# Patient Record
Sex: FEMALE | Race: OTHER | Employment: UNEMPLOYED | URBAN - METROPOLITAN AREA
[De-identification: names, ages, dates, MRNs, and addresses within clinical notes are randomized per-mention and may not be internally consistent; named-entity substitution may affect disease eponyms.]

---

## 2017-09-11 ENCOUNTER — APPOINTMENT (OUTPATIENT)
Dept: ULTRASOUND IMAGING | Age: 37
End: 2017-09-11
Attending: EMERGENCY MEDICINE
Payer: SUBSIDIZED

## 2017-09-11 ENCOUNTER — HOSPITAL ENCOUNTER (EMERGENCY)
Age: 37
Discharge: HOME | End: 2017-09-11
Attending: EMERGENCY MEDICINE | Admitting: EMERGENCY MEDICINE
Payer: SUBSIDIZED

## 2017-09-11 VITALS
OXYGEN SATURATION: 96 % | RESPIRATION RATE: 16 BRPM | TEMPERATURE: 98.1 F | BODY MASS INDEX: 19.56 KG/M2 | WEIGHT: 113.98 LBS | DIASTOLIC BLOOD PRESSURE: 68 MMHG | SYSTOLIC BLOOD PRESSURE: 116 MMHG | HEART RATE: 89 BPM

## 2017-09-11 DIAGNOSIS — R10.13 ABDOMINAL PAIN, EPIGASTRIC: Primary | ICD-10-CM

## 2017-09-11 LAB
ALBUMIN SERPL-MCNC: 4.1 G/DL (ref 3.5–5)
ALBUMIN/GLOB SERPL: 1 {RATIO} (ref 1.1–2.2)
ALP SERPL-CCNC: 85 U/L (ref 45–117)
ALT SERPL-CCNC: 22 U/L (ref 12–78)
ANION GAP SERPL CALC-SCNC: 9 MMOL/L (ref 5–15)
APPEARANCE UR: CLEAR
AST SERPL-CCNC: 12 U/L (ref 15–37)
BACTERIA URNS QL MICRO: NEGATIVE /HPF
BASOPHILS # BLD: 0 K/UL (ref 0–0.1)
BASOPHILS NFR BLD: 0 % (ref 0–1)
BILIRUB SERPL-MCNC: <0.1 MG/DL (ref 0.2–1)
BILIRUB UR QL: NEGATIVE
BUN SERPL-MCNC: 15 MG/DL (ref 6–20)
BUN/CREAT SERPL: 23 (ref 12–20)
CALCIUM SERPL-MCNC: 9.2 MG/DL (ref 8.5–10.1)
CHLORIDE SERPL-SCNC: 102 MMOL/L (ref 97–108)
CO2 SERPL-SCNC: 28 MMOL/L (ref 21–32)
COLOR UR: NORMAL
CREAT SERPL-MCNC: 0.66 MG/DL (ref 0.55–1.02)
EOSINOPHIL # BLD: 0 K/UL (ref 0–0.4)
EOSINOPHIL NFR BLD: 0 % (ref 0–7)
EPITH CASTS URNS QL MICRO: NORMAL /LPF
ERYTHROCYTE [DISTWIDTH] IN BLOOD BY AUTOMATED COUNT: 12.9 % (ref 11.5–14.5)
GLOBULIN SER CALC-MCNC: 4.1 G/DL (ref 2–4)
GLUCOSE SERPL-MCNC: 97 MG/DL (ref 65–100)
GLUCOSE UR STRIP.AUTO-MCNC: NEGATIVE MG/DL
HCT VFR BLD AUTO: 41 % (ref 35–47)
HGB BLD-MCNC: 13.7 G/DL (ref 11.5–16)
HGB UR QL STRIP: NEGATIVE
HYALINE CASTS URNS QL MICRO: NORMAL /LPF (ref 0–5)
KETONES UR QL STRIP.AUTO: NEGATIVE MG/DL
LEUKOCYTE ESTERASE UR QL STRIP.AUTO: NEGATIVE
LIPASE SERPL-CCNC: 142 U/L (ref 73–393)
LYMPHOCYTES # BLD: 2.8 K/UL (ref 0.8–3.5)
LYMPHOCYTES NFR BLD: 27 % (ref 12–49)
MCH RBC QN AUTO: 30.7 PG (ref 26–34)
MCHC RBC AUTO-ENTMCNC: 33.4 G/DL (ref 30–36.5)
MCV RBC AUTO: 91.9 FL (ref 80–99)
MONOCYTES # BLD: 0.8 K/UL (ref 0–1)
MONOCYTES NFR BLD: 8 % (ref 5–13)
NEUTS SEG # BLD: 6.6 K/UL (ref 1.8–8)
NEUTS SEG NFR BLD: 65 % (ref 32–75)
NITRITE UR QL STRIP.AUTO: NEGATIVE
PH UR STRIP: 6.5 [PH] (ref 5–8)
PLATELET # BLD AUTO: 335 K/UL (ref 150–400)
POTASSIUM SERPL-SCNC: 4.1 MMOL/L (ref 3.5–5.1)
PROT SERPL-MCNC: 8.2 G/DL (ref 6.4–8.2)
PROT UR STRIP-MCNC: NEGATIVE MG/DL
RBC # BLD AUTO: 4.46 M/UL (ref 3.8–5.2)
RBC #/AREA URNS HPF: NORMAL /HPF (ref 0–5)
SODIUM SERPL-SCNC: 139 MMOL/L (ref 136–145)
SP GR UR REFRACTOMETRY: 1.01 (ref 1–1.03)
UA: UC IF INDICATED,UAUC: NORMAL
UROBILINOGEN UR QL STRIP.AUTO: 0.2 EU/DL (ref 0.2–1)
WBC # BLD AUTO: 10.2 K/UL (ref 3.6–11)
WBC URNS QL MICRO: NORMAL /HPF (ref 0–4)

## 2017-09-11 PROCEDURE — 36415 COLL VENOUS BLD VENIPUNCTURE: CPT | Performed by: EMERGENCY MEDICINE

## 2017-09-11 PROCEDURE — 96375 TX/PRO/DX INJ NEW DRUG ADDON: CPT

## 2017-09-11 PROCEDURE — 74011250636 HC RX REV CODE- 250/636: Performed by: EMERGENCY MEDICINE

## 2017-09-11 PROCEDURE — 74011000250 HC RX REV CODE- 250: Performed by: EMERGENCY MEDICINE

## 2017-09-11 PROCEDURE — 81001 URINALYSIS AUTO W/SCOPE: CPT | Performed by: EMERGENCY MEDICINE

## 2017-09-11 PROCEDURE — 99283 EMERGENCY DEPT VISIT LOW MDM: CPT

## 2017-09-11 PROCEDURE — 96374 THER/PROPH/DIAG INJ IV PUSH: CPT

## 2017-09-11 PROCEDURE — 83690 ASSAY OF LIPASE: CPT | Performed by: EMERGENCY MEDICINE

## 2017-09-11 PROCEDURE — 96361 HYDRATE IV INFUSION ADD-ON: CPT

## 2017-09-11 PROCEDURE — 85025 COMPLETE CBC W/AUTO DIFF WBC: CPT | Performed by: EMERGENCY MEDICINE

## 2017-09-11 PROCEDURE — 76705 ECHO EXAM OF ABDOMEN: CPT

## 2017-09-11 PROCEDURE — 80053 COMPREHEN METABOLIC PANEL: CPT | Performed by: EMERGENCY MEDICINE

## 2017-09-11 RX ORDER — FAMOTIDINE 10 MG/ML
20 INJECTION INTRAVENOUS
Status: COMPLETED | OUTPATIENT
Start: 2017-09-11 | End: 2017-09-11

## 2017-09-11 RX ORDER — FAMOTIDINE 20 MG/1
20 TABLET, FILM COATED ORAL 2 TIMES DAILY
Qty: 20 TAB | Refills: 0 | Status: SHIPPED | OUTPATIENT
Start: 2017-09-11 | End: 2017-09-21

## 2017-09-11 RX ORDER — ONDANSETRON 2 MG/ML
4 INJECTION INTRAMUSCULAR; INTRAVENOUS
Status: COMPLETED | OUTPATIENT
Start: 2017-09-11 | End: 2017-09-11

## 2017-09-11 RX ORDER — ONDANSETRON 8 MG/1
8 TABLET, ORALLY DISINTEGRATING ORAL
Qty: 12 TAB | Refills: 0 | OUTPATIENT
Start: 2017-09-11 | End: 2020-07-21

## 2017-09-11 RX ADMIN — FAMOTIDINE 20 MG: 10 INJECTION, SOLUTION INTRAVENOUS at 20:22

## 2017-09-11 RX ADMIN — SODIUM CHLORIDE 1000 ML: 900 INJECTION, SOLUTION INTRAVENOUS at 20:19

## 2017-09-11 RX ADMIN — ONDANSETRON 4 MG: 2 INJECTION INTRAMUSCULAR; INTRAVENOUS at 20:20

## 2017-09-11 NOTE — ED TRIAGE NOTES
Patient arrives to ED for complaints of abdominal pain and bloating for about 1 week. Denies diarrhea but nausea presents when eating.

## 2017-09-12 NOTE — DISCHARGE INSTRUCTIONS
Indigestión (dispepsia o acidez estomacal): Instrucciones de cuidado - [ Indigestion (Dyspepsia or Heartburn): Care Instructions ]  Instrucciones de cuidado  A veces puede ser difícil determinar la causa de la indigestión (dispepsia o acidez estomacal). En [employer], los casos de [employer] con abotagamiento, ardor, eructos y náuseas son leves y desaparecen después de varias horas. El tratamiento en el hogar y los medicamentos de venta leilani a menudo pueden [employer] síntomas. Darvin si usted lui algún medicamento para aliviar la indigestión sin hacer cambios en fierro Jarek Yudelka y estilo de alex, es muy probable que vuelvan lisa síntomas repetidamente. Si tiene indigestión con frecuencia, esto podría indicar un problema de ashlie más grave. Asegúrese de hacer un seguimiento con fierro médico, el cual podría necesitar pruebas para asegurarse de cuál es la causa de fierro indigestión. La atención de seguimiento es yosi parte clave de fierro tratamiento y seguridad. Asegúrese de hacer y acudir a todas las citas, y llame a fierro médico si está teniendo problemas. También es yosi buena idea saber los resultados de lisa exámenes y mantener yosi lista de los medicamentos que lui. ¿Cómo puede cuidarse en el [facility]? · Fierro médico podría recomendarle medicamentos de [employer]. Para la indigestión leve u ocasional podrían ayudarle los antiácidos stacia Tums, Gaviscon, Mylanta o Maalox. Tenga cuidado cuando tome medicamentos antiácidos de [employer]. Muchos de estos medicamentos contienen aspirina. Stephany la etiqueta para asegurarse de no estar tomando más de la dosis recomendada. Demasiada aspirina puede ser perjudicial.  · Fierro médico también podría recomendar reductores de ácido de venta leilani, stacia Pepcid AC, Tagamet HB, Zantac 75 o Prilosec. Stephany y siga todas las instrucciones de la [place]. [place] estos medicamentos con frecuencia, hable con fierro médico.  · Cambie lisa hábitos alimentarios.   ¨ Es mejor comer varias comidas pequeñas en lugar de dos o tony comidas abundantes. ¨ Después de comer, espere de 2 a 3 horas antes de recostarse. ¨ El chocolate, la menta y el alcohol pueden empeorar la GERD. ¨ En [employer], los [employer], los alimentos que tienen mucho ácido (stacia los tomates y las naranjas) y el café pueden empeorar los síntomas de la GERD. Si lisa síntomas empeoran después de comer un determinado alimento, se recomienda que deje de comer dre alimento para reshma si lisa síntomas mejoran. · No fume ni masque tabaco. Fumar puede agravar la GERD. Si necesita ayuda para dejar de usar tabaco, hable con fierro médico sobre programas y medicamentos para dejar de usarlo. Estos pueden aumentar lisa probabilidades de dejar el hábito para siempre. · Si tiene síntomas de GERD isiah la noche, eleve la cabecera de fierro cama entre 6 y 6 pulgadas (entre 15 y 20 cm) colocando bloques debajo del chapo de la cama o yosi cuña de espuma debajo de la cabecera del colchón. (Usar almohadas adicionales no funciona). · No use ropa que le ajuste mucho la parte media del cuerpo. · Baje de peso, si necesita hacerlo. Perder solo de 5 a 10 libras (2.3 a 4.5 kg) puede ayudarle. · No tome medicamentos antiinflamatorios stacia aspirina, ibuprofeno (Advil, Motrin) o naproxeno (Aleve). Estos medicamentos pueden irritarle el estómago. Si necesita un analgésico (medicamento para el dolor), pruebe con acetaminofén (Tylenol), que no causa malestar estomacal.  ¿Cuándo debe pedir ayuda? Llame al 911 en cualquier momento que considere que necesita atención de [place]. Por ejemplo, llame si:  · Se desmayó (perdió el conocimiento). · Vomita art o algo parecido a granos de café molido. · Las heces son de color rojizo o muy sanguinolentas (con art). · Siente dolor o presión en el pecho. Estos síntomas podrían estar acompañados de:  ¨ Sudoración. ¨ Falta de aire. ¨ Náuseas o vómito.   ¨ Dolor que se extiende del pecho al josé, juliano Espinoza, o [place] o [place] hombros o brazos. ¨ Sensación de [employer] o aturdimiento. ¨ Pulso rápido o irregular. Después de llamar al 911, mastique 1 aspirina para adultos. Espere yosi ambulancia. No trate de conducir usted mismo un automóvil. Llame a fierro médico ahora mismo o busque atención médica inmediata si:  · Tiene dolor abdominal intenso. · Amarilys heces son negruzcas y parecidas al alquitrán o tienen rastros de [address] · Tiene dificultades para tragar. · Pierde peso y no sabe por qué. Preste especial atención a los cambios en fierro ashlie y asegúrese de comunicarse con fierro médico si:  · No mejora stacia se esperaba. ¿Dónde puede encontrar más información en inglés? Chelsie Srivastava a [email or web address]-cathryn.info/. Tammy Chan H026 en la búsqueda para aprender más acerca de \"Indigestión (dispepsia o acidez estomacal): Instrucciones de cuidado - [ Indigestion (Dyspepsia or Heartburn): Care Instructions ]. \"  Revisado: 16 noviembre, 2016  Versión del contenido: 11.3  © 9684-5634 Healthwise, Incorporated. Las instrucciones de cuidado fueron adaptadas bajo licencia por [employer] (which disclaims liability or warranty for this information). Si usted tiene [place] afección médica o sobre estas instrucciones, siempre pregunte a fierro profesional de ashlie. Healthwise, Incorporated niega toda garantía o responsabilidad por fierro uso de esta información.

## 2017-09-12 NOTE — ED PROVIDER NOTES
HPI Comments: 42 y.o. female with past medical history significant for depression who presents to the ED with chief complaint of abdominal pain. Pt reports epigastric and RUQ abdominal pain onset about a week ago that is exacerbated by eating. Pt states her abdominal pain is accompanied by nausea. Pt states she has taken Pepto Bismol with some relief. Pt denies hx of abdominal surgeries. Pt denies fever, chills, vomiting, urinary sx, or diarrhea. There are no other acute medical complaints voiced at this time. Social Hx: Denies EtOH use. PCP: Tre Weir MD    Note written by Shilo Fitzgerald, as dictated by Love Chowdary. Chavo Monroy MD 8:00 PM     The history is provided by the patient and a relative. The history is limited by a language barrier ([place]). No  was used. Past Medical History:   Diagnosis Date    Psychiatric disorder     depression       History reviewed. No pertinent surgical history. History reviewed. No pertinent family history. Social History     Social History    Marital status: SINGLE     Spouse name: N/A    Number of children: N/A    Years of education: N/A     Occupational History    Not on file. Social History Main Topics    Smoking status: Never Smoker    Smokeless tobacco: Never Used    Alcohol use No    Drug use: No    Sexual activity: Not on file     Other Topics Concern    Not on file     Social History Narrative         ALLERGIES: Review of patient's allergies indicates no known allergies. Review of Systems   Constitutional: Negative for chills and fever. HENT: Negative for ear pain and sore throat. Eyes: Negative for pain. Respiratory: Negative for chest tightness and shortness of breath. Cardiovascular: Negative for chest pain and leg swelling. Gastrointestinal: Positive for abdominal pain (epigastric and RUQ) and nausea. Negative for diarrhea and vomiting.    Genitourinary: Negative for difficulty urinating, dysuria, flank pain, frequency and hematuria. Musculoskeletal: Negative for back pain. Skin: Negative for rash. Neurological: Negative for headaches. All other systems reviewed and are negative. Vitals:    09/11/17 1926   BP: 118/59   Pulse: 90   Resp: 18   Temp: 98.1 °F (36.7 °C)   SpO2: 95%   Weight: 51.7 kg (113 lb 15.7 oz)            Physical Exam   Constitutional: She appears well-developed and well-nourished. HENT:   Head: Normocephalic and atraumatic. Mouth/Throat: Oropharynx is clear and moist.   Eyes: Conjunctivae and EOM are normal. Pupils are equal, round, and reactive to light. No scleral icterus. Neck: Neck supple. No tracheal deviation present. Cardiovascular: Normal rate, regular rhythm, normal heart sounds and intact distal pulses. Pulmonary/Chest: Effort normal and breath sounds normal. No respiratory distress. Abdominal: Soft. She exhibits no distension. There is tenderness (epigastric region and RUQ). There is no rebound and no guarding. Genitourinary:   Genitourinary Comments: deferred   Musculoskeletal: She exhibits no edema. Neurological: She is alert. Skin: Skin is warm and dry. Psychiatric: She has a normal mood and affect. Nursing note and vitals reviewed. Note written by Shilo Love, as dictated by Clayton Jackson. Honey Dash MD 8:01 PM    MDM  Number of Diagnoses or Management Options  Abdominal pain, epigastric:   Diagnosis management comments: The patient presents with abdominal pain with a differential diagnosis of biliary colic, cholecystitis, gastritis and pancreatitis      ED Course       Procedures    10:35 PM  The patient has been reevaluated. The patient is ready for discharge. The patient's signs, symptoms, diagnosis, and discharge instructions have been discussed and the patient/ family has conveyed their understanding. The patient is to follow up as recommended or return to the ED should their symptoms worsen.  Plan has been discussed and the patient is in agreement. LABORATORY TESTS:  Recent Results (from the past 12 hour(s))   CBC WITH AUTOMATED DIFF    Collection Time: 09/11/17  7:52 PM   Result Value Ref Range    WBC 10.2 3.6 - 11.0 K/uL    RBC 4.46 3.80 - 5.20 M/uL    HGB 13.7 11.5 - 16.0 g/dL    HCT 41.0 35.0 - 47.0 %    MCV 91.9 80.0 - 99.0 FL    MCH 30.7 26.0 - 34.0 PG    MCHC 33.4 30.0 - 36.5 g/dL    RDW 12.9 11.5 - 14.5 %    PLATELET 969 234 - 137 K/uL    NEUTROPHILS 65 32 - 75 %    LYMPHOCYTES 27 12 - 49 %    MONOCYTES 8 5 - 13 %    EOSINOPHILS 0 0 - 7 %    BASOPHILS 0 0 - 1 %    ABS. NEUTROPHILS 6.6 1.8 - 8.0 K/UL    ABS. LYMPHOCYTES 2.8 0.8 - 3.5 K/UL    ABS. MONOCYTES 0.8 0.0 - 1.0 K/UL    ABS. EOSINOPHILS 0.0 0.0 - 0.4 K/UL    ABS. BASOPHILS 0.0 0.0 - 0.1 K/UL   LIPASE    Collection Time: 09/11/17  7:52 PM   Result Value Ref Range    Lipase 142 73 - 509 U/L   METABOLIC PANEL, COMPREHENSIVE    Collection Time: 09/11/17  7:52 PM   Result Value Ref Range    Sodium 139 136 - 145 mmol/L    Potassium 4.1 3.5 - 5.1 mmol/L    Chloride 102 97 - 108 mmol/L    CO2 28 21 - 32 mmol/L    Anion gap 9 5 - 15 mmol/L    Glucose 97 65 - 100 mg/dL    BUN 15 6 - 20 MG/DL    Creatinine 0.66 0.55 - 1.02 MG/DL    BUN/Creatinine ratio 23 (H) 12 - 20      GFR est AA >60 >60 ml/min/1.73m2    GFR est non-AA >60 >60 ml/min/1.73m2    Calcium 9.2 8.5 - 10.1 MG/DL    Bilirubin, total <0.1 (L) 0.2 - 1.0 MG/DL    ALT (SGPT) 22 12 - 78 U/L    AST (SGOT) 12 (L) 15 - 37 U/L    Alk.  phosphatase 85 45 - 117 U/L    Protein, total 8.2 6.4 - 8.2 g/dL    Albumin 4.1 3.5 - 5.0 g/dL    Globulin 4.1 (H) 2.0 - 4.0 g/dL    A-G Ratio 1.0 (L) 1.1 - 2.2     URINALYSIS W/ REFLEX CULTURE    Collection Time: 09/11/17  8:00 PM   Result Value Ref Range    Color YELLOW/STRAW      Appearance CLEAR CLEAR      Specific gravity 1.007 1.003 - 1.030      pH (UA) 6.5 5.0 - 8.0      Protein NEGATIVE  NEG mg/dL    Glucose NEGATIVE  NEG mg/dL    Ketone NEGATIVE  NEG mg/dL    Bilirubin NEGATIVE  NEG      Blood NEGATIVE  NEG      Urobilinogen 0.2 0.2 - 1.0 EU/dL    Nitrites NEGATIVE  NEG      Leukocyte Esterase NEGATIVE  NEG      WBC 0-4 0 - 4 /hpf    RBC 0-5 0 - 5 /hpf    Epithelial cells FEW FEW /lpf    Bacteria NEGATIVE  NEG /hpf    UA:UC IF INDICATED CULTURE NOT INDICATED BY UA RESULT CNI      Hyaline cast 0-2 0 - 5 /lpf       IMAGING RESULTS:  US ABD LTD   Final Result        [facility]    Result Date: 9/11/2017  INDICATION:  Right upper quadrant pain COMPARISON:  None FINDINGS: Limited right upper quadrant ultrasound was performed. The liver is normal. The common bile duct is normal measuring 4 mm in diameter. The gallbladder is normal. The sonographic Liset Lovings sign is absent. The right kidney measures 11.1 cm in length. There is no hydronephrosis or visible ascites. IMPRESSION: Normal right upper quadrant ultrasound. MEDICATIONS GIVEN:  Medications   sodium chloride 0.9 % bolus infusion 1,000 mL (0 mL IntraVENous IV Completed 9/11/17 2149)   ondansetron (ZOFRAN) injection 4 mg (4 mg IntraVENous Given 9/11/17 2020)   famotidine (PF) (PEPCID) injection 20 mg (20 mg IntraVENous Given 9/11/17 2022)       IMPRESSION:  1. Abdominal pain, epigastric        PLAN:  1. Discharge Medication List as of 9/11/2017  9:37 PM      START taking these medications    Details   famotidine (PEPCID) 20 mg tablet Take 1 Tab by mouth two (2) times a day for 10 days. , Print, Disp-20 Tab, R-0      ondansetron (ZOFRAN ODT) 8 mg disintegrating tablet Take 1 Tab by mouth every eight (8) hours as needed for Nausea. , Print, Disp-12 Tab, R-0         CONTINUE these medications which have NOT CHANGED    Details   FLUoxetine (PROZAC) 20 mg capsule Take 20 mg by mouth daily. , Historical Med           2.    Follow-up Information     Follow up With Details Comments [address] MD Call in 1 day  [address]  [phone]      Your PCP Call in 1 day      [facility] EMERGENCY DEPT  If symptoms worsen [address] [facility]  [phone]            Return to ED for new or worsening symptoms       Azeb Herring MD

## 2020-07-19 ENCOUNTER — HOSPITAL ENCOUNTER (EMERGENCY)
Age: 40
Discharge: HOME | End: 2020-07-19
Attending: EMERGENCY MEDICINE | Admitting: EMERGENCY MEDICINE
Payer: SUBSIDIZED

## 2020-07-19 VITALS
RESPIRATION RATE: 18 BRPM | DIASTOLIC BLOOD PRESSURE: 71 MMHG | HEIGHT: 64 IN | SYSTOLIC BLOOD PRESSURE: 103 MMHG | WEIGHT: 120 LBS | BODY MASS INDEX: 20.49 KG/M2 | OXYGEN SATURATION: 99 % | HEART RATE: 68 BPM | TEMPERATURE: 97.9 F

## 2020-07-19 DIAGNOSIS — H66.90 ACUTE OTITIS MEDIA, UNSPECIFIED OTITIS MEDIA TYPE: Primary | ICD-10-CM

## 2020-07-19 DIAGNOSIS — R42 DIZZY SPELLS: ICD-10-CM

## 2020-07-19 PROCEDURE — 99284 EMERGENCY DEPT VISIT MOD MDM: CPT

## 2020-07-19 PROCEDURE — 74011250636 HC RX REV CODE- 250/636: Performed by: EMERGENCY MEDICINE

## 2020-07-19 RX ORDER — MECLIZINE HYDROCHLORIDE 25 MG/1
50 TABLET ORAL
Status: COMPLETED | OUTPATIENT
Start: 2020-07-19 | End: 2020-07-19

## 2020-07-19 RX ORDER — AMOXICILLIN AND CLAVULANATE POTASSIUM 875; 125 MG/1; MG/1
1 TABLET, FILM COATED ORAL 2 TIMES DAILY
Qty: 20 TAB | Refills: 0 | Status: SHIPPED | OUTPATIENT
Start: 2020-07-19 | End: 2020-07-29

## 2020-07-19 RX ORDER — MECLIZINE HYDROCHLORIDE 25 MG/1
25 TABLET ORAL
Qty: 30 TAB | Refills: 0 | Status: SHIPPED | OUTPATIENT
Start: 2020-07-19 | End: 2020-07-29

## 2020-07-19 RX ADMIN — MECLIZINE HYDROCHLORIDE 50 MG: 25 TABLET ORAL at 03:46

## 2020-07-19 NOTE — ED PROVIDER NOTES
Date of Service:  7/19/2020    Patient:  Edgardo Miranda    Chief Complaint:  Dizziness       HPI:  Edgardo Miranda is a 41 y.o.  female who presents for evaluation of dizziness and ringing in her ears. Patient states that for the last 2 to 3 days she has been having intermittent episodes of dizziness. She states that they are getting progressively worse. They seem to be related to position. She does not happen all the time. She also complains of some ringing in her ear right greater than the left. Otherwise patient denies any type of fevers chills chest pain shortness of breath abdominal pain nausea vomiting diarrhea headaches or other acute complaints. No head trauma. Past Medical History:   Diagnosis Date    Psychiatric disorder     depression       No past surgical history on file. No family history on file.     Social History     Socioeconomic History    Marital status: SINGLE     Spouse name: Not on file    Number of children: Not on file    Years of education: Not on file    Highest education level: Not on file   Occupational History    Not on file   Social Needs    Financial resource strain: Not on file    Food insecurity     Worry: Not on file     Inability: Not on file    Transportation needs     Medical: Not on file     Non-medical: Not on file   Tobacco Use    Smoking status: Never Smoker    Smokeless tobacco: Never Used   Substance and Sexual Activity    Alcohol use: No    Drug use: No    Sexual activity: Not on file   Lifestyle    Physical activity     Days per week: Not on file     Minutes per session: Not on file    Stress: Not on file   Relationships    Social connections     Talks on phone: Not on file     Gets together: Not on file     Attends Denominational service: Not on file     Active member of club or organization: Not on file     Attends meetings of clubs or organizations: Not on file     Relationship status: Not on file    Intimate partner violence Fear of current or ex partner: Not on file     Emotionally abused: Not on file     Physically abused: Not on file     Forced sexual activity: Not on file   Other Topics Concern    Not on file   Social History Narrative    Not on file         ALLERGIES: Patient has no known allergies. Review of Systems   HENT: Positive for tinnitus. Negative for ear pain, hearing loss and sore throat. Respiratory: Negative for shortness of breath. Cardiovascular: Negative for chest pain. Gastrointestinal: Positive for nausea. Negative for abdominal pain. Neurological: Positive for dizziness. Negative for weakness, light-headedness and headaches. Psychiatric/Behavioral: Negative for confusion. Vitals:    07/19/20 0339   BP: 141/77   Pulse: 70   Resp: 18   Temp: 97.9 °F (36.6 °C)   SpO2: 99%   Weight: 54.4 kg (120 lb)   Height: 5' 4\" (1.626 m)            Physical Exam  Vitals signs and nursing note reviewed. Constitutional:       General: She is not in acute distress. Appearance: She is well-developed. She is not ill-appearing, toxic-appearing or diaphoretic. HENT:      Head: Normocephalic and atraumatic. Right Ear: Ear canal and external ear normal. There is no impacted cerumen. No mastoid tenderness. Tympanic membrane is injected and erythematous. Left Ear: Tympanic membrane, ear canal and external ear normal. There is no impacted cerumen. No mastoid tenderness. Nose: Nose normal. No congestion. Mouth/Throat:      Mouth: Mucous membranes are moist.      Pharynx: Oropharynx is clear. No oropharyngeal exudate or posterior oropharyngeal erythema. Eyes:      General: No scleral icterus. Right eye: No discharge. Left eye: No discharge. Extraocular Movements: Extraocular movements intact. Conjunctiva/sclera: Conjunctivae normal.      Pupils: Pupils are equal, round, and reactive to light. Neck:      Musculoskeletal: Neck supple. Vascular: No JVD. Trachea: No tracheal deviation. Cardiovascular:      Rate and Rhythm: Normal rate. Pulses: Normal pulses. Heart sounds: No murmur. Pulmonary:      Effort: Pulmonary effort is normal. No respiratory distress. Breath sounds: Normal breath sounds. Abdominal:      General: Abdomen is flat. Bowel sounds are normal.   Musculoskeletal: Normal range of motion. Right lower leg: No edema. Left lower leg: No edema. Skin:     General: Skin is warm and dry. Capillary Refill: Capillary refill takes less than 2 seconds. Findings: No rash. Neurological:      Mental Status: She is alert and oriented to person, place, and time. Mental status is at baseline. Cranial Nerves: No cranial nerve deficit. Sensory: No sensory deficit. Motor: No weakness. Coordination: Coordination normal.      Gait: Gait normal.   Psychiatric:         Mood and Affect: Mood normal.         Behavior: Behavior normal.         Thought Content: Thought content normal.         Judgment: Judgment normal.          MDM  Number of Diagnoses or Management Options  Acute otitis media, unspecified otitis media type:   Dizzy spells:        VITAL SIGNS:  Patient Vitals for the past 4 hrs:   Temp Pulse Resp BP SpO2   07/19/20 0500  68 18 103/71 99 %   07/19/20 0339 97.9 °F (36.6 °C) 70 18 141/77 99 %         LABS:  No results found for this or any previous visit (from the past 6 hour(s)). IMAGING:  No orders to display         Medications During Visit:  Medications   meclizine (ANTIVERT) tablet 50 mg (50 mg Oral Given 7/19/20 0346)         DECISION MAKING:  Kathleen Velasquez is a 41 y.o. female who comes in as above. Here patient appears well. No  needed for encounter. Patient has had minimal resolution of dizziness but has a normal neuro exam and ambulates well. Patient has otitis media which could be contributing to his symptoms.  Will treat with medications as below and have her follow up with PCP Monday. Patient agreeable to the plan. All questions answered. IMPRESSION:  1. Acute otitis media, unspecified otitis media type    2. Dizzy spells        DISPOSITION:  Discharged      Discharge Medication List as of 7/19/2020  4:49 AM      START taking these medications    Details   amoxicillin-clavulanate (Augmentin) 875-125 mg per tablet Take 1 Tab by mouth two (2) times a day for 10 days. , Print, Disp-20 Tab,R-0      meclizine (ANTIVERT) 25 mg tablet Take 1 Tab by mouth three (3) times daily as needed for Dizziness for up to 10 days. , Print, Disp-30 Tab,R-0         CONTINUE these medications which have NOT CHANGED    Details   ondansetron (ZOFRAN ODT) 8 mg disintegrating tablet Take 1 Tab by mouth every eight (8) hours as needed for Nausea. , Print, Disp-12 Tab, R-0      FLUoxetine (PROZAC) 20 mg capsule Take 20 mg by mouth daily. , Historical Med              Follow-up Information     Follow up With Specialties Details Why Contact Info    Your PCP  Schedule an appointment as soon as possible for a visit               The patient is asked to follow-up with their primary care provider in the next several days. They are to call tomorrow for an appointment. The patient is asked to return promptly for any increased concerns or worsening of symptoms. They can return to this emergency department or any other emergency department.     Procedures

## 2020-07-19 NOTE — ED TRIAGE NOTES
Pt complaining of worsening dizziness over the last 3 days. Pt states that the room is spinning.  Denies N/V.

## 2020-07-21 ENCOUNTER — HOSPITAL ENCOUNTER (EMERGENCY)
Age: 40
Discharge: HOME | End: 2020-07-21
Attending: EMERGENCY MEDICINE
Payer: SUBSIDIZED

## 2020-07-21 VITALS
BODY MASS INDEX: 25.75 KG/M2 | RESPIRATION RATE: 16 BRPM | OXYGEN SATURATION: 98 % | DIASTOLIC BLOOD PRESSURE: 64 MMHG | HEART RATE: 86 BPM | SYSTOLIC BLOOD PRESSURE: 105 MMHG | TEMPERATURE: 99.1 F | WEIGHT: 150 LBS

## 2020-07-21 DIAGNOSIS — J30.2 SEASONAL ALLERGIC RHINITIS, UNSPECIFIED TRIGGER: ICD-10-CM

## 2020-07-21 DIAGNOSIS — R42 VERTIGO: Primary | ICD-10-CM

## 2020-07-21 PROCEDURE — 74011636637 HC RX REV CODE- 636/637: Performed by: EMERGENCY MEDICINE

## 2020-07-21 PROCEDURE — 74011250637 HC RX REV CODE- 250/637: Performed by: EMERGENCY MEDICINE

## 2020-07-21 PROCEDURE — 99283 EMERGENCY DEPT VISIT LOW MDM: CPT

## 2020-07-21 RX ORDER — PREDNISONE 20 MG/1
40 TABLET ORAL
Status: COMPLETED | OUTPATIENT
Start: 2020-07-21 | End: 2020-07-21

## 2020-07-21 RX ORDER — ONDANSETRON 4 MG/1
4 TABLET, ORALLY DISINTEGRATING ORAL
Qty: 4 TAB | Refills: 0 | Status: SHIPPED | OUTPATIENT
Start: 2020-07-21

## 2020-07-21 RX ORDER — PREDNISONE 20 MG/1
20 TABLET ORAL DAILY
Qty: 4 TAB | Refills: 0 | Status: SHIPPED | OUTPATIENT
Start: 2020-07-21 | End: 2020-07-25

## 2020-07-21 RX ORDER — ONDANSETRON 4 MG/1
4 TABLET, ORALLY DISINTEGRATING ORAL
Status: COMPLETED | OUTPATIENT
Start: 2020-07-21 | End: 2020-07-21

## 2020-07-21 RX ADMIN — PREDNISONE 40 MG: 20 TABLET ORAL at 15:24

## 2020-07-21 RX ADMIN — ONDANSETRON 4 MG: 4 TABLET, ORALLY DISINTEGRATING ORAL at 15:24

## 2020-07-21 NOTE — DISCHARGE INSTRUCTIONS
Patient Education   Please continue with your previously prescribed medications until complete. Alergias estacionales: Instrucciones de cuidado  Seasonal Allergies: Care Instructions  Instrucciones de cuidado  Las alergias ocurren cuando el sistema de defensa del organismo (sistema inmunitario) reacciona de manera excesiva ante ciertas sustancias. El sistema inmunitario trata yosi sustancia inofensiva stacia si fuera un microbio perjudicial o un virus. Muchas cosas pueden provocar esta reacción excesiva. Los ejemplos [employer], los medicamentos, los alimentos, el polvo, la caspa de los animales y el moho. Lisa alergias son estacionales si usted presenta síntomas solo en ciertas épocas del [place]. En dre pebbles, probablemente sea alérgico al polen de ciertos árboles, hierbas o [place]. Las [employer] pueden ser leves o graves. Los medicamentos de [address] para las alergias pueden aliviar algunos síntomas. Stephany y siga todas las indicaciones en la etiqueta. Manejar las alergias es yosi parte importante del estar saludable. Fierro médico puede sugerirle que se someta a pruebas para tratar de encontrar la causa de lisa alergias. Yosi vez que sepa qué cosas provocan los síntomas, puede evitarlas. [place] puede prevenir los síntomas de [place] y [de-identified] de [facility]. En algunos casos, la inmunoterapia podría ayudar. Para alea tratamiento, usted se aplica inyecciones o [place] pastillas que contienen yosi pequeña cantidad de ciertos alérgenos. Fierro cuerpo \"se acostumbra\" al alérgeno, de modo que usted reacciona menos a alea con el Dor[place]. Alea tipo de tratamiento puede ayudarle a prevenir o reducir algunos síntomas de las alergias. La atención de seguimiento es yosi parte clave de fierro tratamiento y seguridad. Asegúrese de hacer y acudir a todas las citas, y llame a fierro médico si está teniendo problemas. También es yosi buena idea saber los resultados de lisa exámenes y mantener yosi lista de los medicamentos que lui.   ¿Cómo puede cuidarse en el hogar? · Sea vargas con los medicamentos. [place] lisa medicamentos exactamente stacia le fueron recetados. Llame a fierro médico si irma estar teniendo un problema con los medicamentos. · Sukhdev la temporada de [place], [place] las ventanas cerradas. Si necesita utilizar aire acondicionado, Veronica o limpie todos los filtros cada mes. Dúchese y [employer] ropa después de bobby estado afuera. · Permanezca en interiores cuando haya mucho polen. Pase la aspiradora yosi o dos veces a la semana. Use oysi aspiradora con filtro HEPA o filtro de doble espesor. ¿Cuándo debe pedir ayuda? Aplíquese yosi inyección de epinefrina si:  · Irma que está teniendo yosi reacción alérgica grave. Después de aplicarse yosi inyección de epinefrina, llame al 911, incluso si se siente mejor. Llame  si:  · Tiene síntomas de yosi reacción alérgica grave. Estos pueden incluir:  ? Zonas elevadas y enrojecidas (ronchas) que aparecen repentinamente por todo el cuerpo. ? Hinchazón de la garganta, la boca, los labios o la [place]. ? Dificultades para respirar. ? Pérdida del conocimiento [facility]). O puede sentirse muy mareado o de repente sentirse débil, confuso o inquieto. · Le galarza aplicado yosi inyección de epinefrina, incluso si se siente mejor. Llame a fierro médico ahora mismo o busque atención médica inmediata si:  · Tiene síntomas de yosi reacción alérgica, stacia:  ? Salpullido o ronchas (zonas elevadas y enrojecidas en la piel). ? Comezón. ? Dorathy Fried. ? Dolor abdominal, náuseas o vómito. Preste especial atención a los cambios en fierro ashlie y asegúrese de comunicarse con fierro médico si:  · No mejora stacia se esperaba. ¿Dónde puede encontrar más información en inglés? Vaya a [email or web address]  Ramiro Panda N[address] en la búsqueda para aprender más acerca de \"Alergias estacionales: Instrucciones de cuidado. \"  Revisado: 7 octubre, 8321               [facility] del contenido: 12.5  © 9239-2591 Healthwise, Incorporated. Las instrucciones de cuidado fueron adaptadas bajo licencia por [employer] (which disclaims liability or warranty for this information). Si usted tiene [place] afección médica o sobre estas instrucciones, siempre pregunte a fierro profesional de ashlie. [facility] niega toda garantía o responsabilidad por fierro uso de esta información. Patient Education        Vértigo: Instrucciones de cuidado  Vertigo: Care Instructions  Instrucciones de cuidado    El vértigo es yosi sensación de que usted o el ambiente que le rodea se mueve cuando no es así. Con frecuencia se describe stacia yosi sensación de girar, christina vueltas, caer o inclinarse. El vértigo podría hacer que vomite o sienta náuseas. Podría tener dificultades para caminar o estar de pie y podría perder el equilibrio. El vértigo es a [employer] relacionado con un problema del oído interno, cedrick puede tener otras causas más serias. Si el vértigo continúa, usted podría necesitar más pruebas para hallar fierro causa. La atención de seguimiento es yosi parte clave de fierro tratamiento y seguridad. Asegúrese de hacer y acudir a todas las citas, y llame a fierro médico si está teniendo problemas. También es yosi buena idea saber los resultados de lisa exámenes y mantener yosi lista de los medicamentos que lui. ¿Cómo puede cuidarse en el hogar? · No se acueste boca arriba. Elévese un poco. [place] podría reducir la sensación de girar. Mantenga los ojos abiertos. · Muévase despacio para no caer. · Si fierro médico le recomienda algún medicamento, tómelo exactamente según las indicaciones. · No conduzca cuando tenga vértigo. Ciertos ejercicios, conocidos stacia ejercicios de Pop, pueden ayudar a disminuir el vértigo. Para hacer los ejercicios de Pop:  · Siéntese al borde de yosi cama o un sofá y acuéstese rápido del lado que le causa el peor vértigo. Acuéstese de lado, sobre el oído [place].   · Quédese en dari posición isiah por lo menos 30 segundos o hasta que el vértigo pase. · Siéntese. Si esto le causa vértigo, espere a que pase. · Repita alea procedimiento del otro lado. · Repita esto 10 veces. Daniel estos ejercicios 2 veces al día hasta que la sensación de vértigo desaparezca. ¿Cuándo debe pedir ayuda? Llame [id number] en cualquier momento que considere que necesita atención de emergencia. Por ejemplo, llame si:  · Se desmayó (perdió el conocimiento). · Tiene síntomas de un ataque cerebral. Estos podrían incluir:  ? Entumecimiento, hormigueo, debilitamiento o pérdida de movimiento repentinos en la kevyn, el brazo o la pierna, sobre todo si ocurre en un solo lado del cuerpo. ? Cambios repentinos en la visión. ? Dificultades repentinas para hablar. ? Confusión repentina o dificultad para comprender frases sencillas. ? Problemas repentinos para caminar o mantener el equilibrio. ? Dolor de [place] intenso y repentino, distinto de los miguel de [place]. Llame a fierro médico ahora mismo o busque atención médica inmediata si:  · Tiene vértigo junto con fiebre, dolor de mag o zumbido en los oídos. · Tiene náuseas o vómito nuevos o éstos aumentan. Preste especial atención a los cambios en fierro ashlie y asegúrese de comunicarse con fierro médico si:  · El vértigo empeora u ocurre con mayor frecuencia. · El vértigo no mejora después de 2 semanas. ¿Dónde puede encontrar más información en inglés? Valentino Fraise a [email or web address]-[email or web address].[email or web address]  [place]osi [address]708 en la búsqueda para aprender más acerca de \"Vértigo: Instrucciones de cuidado. \"  Revisado: 29 julio, 0980               GKTJVQE del contenido: 12.5  © 5120-7259 Healthwise, Incorporated. Las instrucciones de cuidado fueron adaptadas bajo licencia por [employer] (which disclaims liability or warranty for this information). Si usted tiene [place] afección médica o sobre estas instrucciones, siempre pregunte a fierro profesional de ashlie.  [facility] niega toda garantía o responsabilidad por fierro uso de esta información.

## 2020-07-21 NOTE — ED PROVIDER NOTES
HPI patient is a 49-year-old  female who presents to the ED with reports of continued intermittent dizzy spells since Thursday evening. She was evaluated here on July 19 and prescribed Augmentin and Antivert which she has been taking with slight improvement. The dizziness increases with head movement. It does not interfere with her sleep at night. Denies fever,cough, cold symptoms,headache,  neck pain, visual changes, focal weakness or rash. Denies any  difficulty breathing, difficulty swallowing, SOB or chest pain. Denies any nausea, vomiting or diarrhea. Pt. Reports that she drove herself here. Hand eye coordination are intact; normal reflexes; normal gait. Language line employed; old chart reviewed. Past Medical History:   Diagnosis Date    Psychiatric disorder     depression       No past surgical history on file. No family history on file.     Social History     Socioeconomic History    Marital status: SINGLE     Spouse name: Not on file    Number of children: Not on file    Years of education: Not on file    Highest education level: Not on file   Occupational History    Not on file   Social Needs    Financial resource strain: Not on file    Food insecurity     Worry: Not on file     Inability: Not on file    Transportation needs     Medical: Not on file     Non-medical: Not on file   Tobacco Use    Smoking status: Never Smoker    Smokeless tobacco: Never Used   Substance and Sexual Activity    Alcohol use: No    Drug use: No    Sexual activity: Not on file   Lifestyle    Physical activity     Days per week: Not on file     Minutes per session: Not on file    Stress: Not on file   Relationships    Social connections     Talks on phone: Not on file     Gets together: Not on file     Attends Denominational service: Not on file     Active member of club or organization: Not on file     Attends meetings of clubs or organizations: Not on file     Relationship status: Not on file   [address] [place] [address] Intimate partner violence     Fear of current or ex partner: Not on file     Emotionally abused: Not on file     Physically abused: Not on file     Forced sexual activity: Not on file   Other Topics Concern    Not on file   Social History Narrative    Not on file         ALLERGIES: Patient has no known allergies. Review of Systems   Constitutional: Negative for activity change, appetite change, fever and unexpected weight change. HENT: Negative for congestion and trouble swallowing. Gastrointestinal: Negative for abdominal pain. Genitourinary: Negative for dysuria. Neurological: Positive for dizziness. Negative for weakness, light-headedness and headaches. All other systems reviewed and are negative. Vitals:    20 1454 20 1517   BP: 105/64    Pulse: 86    Resp: 16    Temp: 99.1 °F (37.3 °C)    SpO2: 98% 98%   Weight: 68 kg (150 lb)             Physical Exam  Vitals signs and nursing note reviewed. Constitutional:       General: She is not in acute distress. Appearance: Normal appearance. She is not ill-appearing, toxic-appearing or diaphoretic. 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 Right Ear: Tympanic membrane normal.      Left Ear: Tympanic membrane normal.      Ears:      Comments: Small amount of soft cerumen in right ear canal     Nose: Nose normal. No rhinorrhea. Mouth/Throat:      Mouth: Mucous membranes are moist.      Comments: No apparent dental trauma; no obvious dental abscess  Neck:      Musculoskeletal: Normal range of motion and neck supple. Cardiovascular:      Rate and Rhythm: Normal rate and regular rhythm. Pulmonary:      Effort: Pulmonary effort is normal.      Breath sounds: Normal breath sounds. Abdominal:      General: Bowel sounds are normal.      Palpations: Abdomen is soft. Tenderness: There is no abdominal tenderness. There is no guarding.    Musculoskeletal: Normal range of motion. Lymphadenopathy:      Cervical: No cervical adenopathy. Skin:     General: Skin is warm and dry. Neurological:      General: No focal deficit present. Mental Status: She is alert and oriented to person, place, and time. Psychiatric:         Mood and Affect: Mood normal.         Behavior: Behavior normal.          MDM       Procedures    Suspect benign positional vertigo with possible seasonal allergies. Pt reports nasal congestion and hx of seasonal allergies. Plan to have her continue with her previously prescribed meds as ordered until complete. Recommend close follow-up with ENT if symptoms persist.  3:55 PM  Patient's results and plan of care have been reviewed with her. Patient and/or family have verbally conveyed their understanding and agreement of the patient's signs, symptoms, diagnosis, treatment and prognosis and additionally agree to follow up as recommended or return to the Emergency Room should her condition change prior to follow-up. Discharge instructions have also been provided to the patient with some educational information regarding her diagnosis as well a list of reasons why she would want to return to the ER prior to her follow-up appointment should her condition change. Stefan Easton NP

## 2020-07-21 NOTE — ED TRIAGE NOTES
Patient was seem in ER Sunday for dizziness and diagnosed with an ear infection and given Augmentin and meclizine. Patient states medications have not helped and is still dizzy.

## 2020-07-21 NOTE — PROGRESS NOTES
Provided  services remotely to [facility] ELDA MONTGOMERY RN during pre-discharge instructions.               [employer]  [phone]

## 2020-10-06 ENCOUNTER — HOSPITAL ENCOUNTER (OUTPATIENT)
Dept: LAB | Age: 40
Discharge: HOME | End: 2020-10-06

## 2020-10-06 LAB
ALBUMIN SERPL-MCNC: 3.9 G/DL (ref 3.5–5)
ALBUMIN/GLOB SERPL: 1 {RATIO} (ref 1.1–2.2)
ALP SERPL-CCNC: 89 U/L (ref 45–117)
ALT SERPL-CCNC: 30 U/L (ref 12–78)
ANION GAP SERPL CALC-SCNC: 5 MMOL/L (ref 5–15)
AST SERPL-CCNC: 13 U/L (ref 15–37)
BASOPHILS # BLD: 0 K/UL (ref 0–0.1)
BASOPHILS NFR BLD: 0 % (ref 0–1)
BILIRUB SERPL-MCNC: 0.2 MG/DL (ref 0.2–1)
BUN SERPL-MCNC: 12 MG/DL (ref 6–20)
BUN/CREAT SERPL: 18 (ref 12–20)
CALCIUM SERPL-MCNC: 9.5 MG/DL (ref 8.5–10.1)
CHLORIDE SERPL-SCNC: 105 MMOL/L (ref 97–108)
CO2 SERPL-SCNC: 28 MMOL/L (ref 21–32)
CREAT SERPL-MCNC: 0.68 MG/DL (ref 0.55–1.02)
DIFFERENTIAL METHOD BLD: NORMAL
EOSINOPHIL # BLD: 0.1 K/UL (ref 0–0.4)
EOSINOPHIL NFR BLD: 1 % (ref 0–7)
ERYTHROCYTE [DISTWIDTH] IN BLOOD BY AUTOMATED COUNT: 13 % (ref 11.5–14.5)
FSH SERPL-ACNC: 81.7 MIU/ML
GLOBULIN SER CALC-MCNC: 3.8 G/DL (ref 2–4)
GLUCOSE SERPL-MCNC: 100 MG/DL (ref 65–100)
HCT VFR BLD AUTO: 39.6 % (ref 35–47)
HGB BLD-MCNC: 12.6 G/DL (ref 11.5–16)
IMM GRANULOCYTES # BLD AUTO: 0 K/UL (ref 0–0.04)
IMM GRANULOCYTES NFR BLD AUTO: 0 % (ref 0–0.5)
LH SERPL-ACNC: 44.9 MIU/ML
LYMPHOCYTES # BLD: 2.2 K/UL (ref 0.8–3.5)
LYMPHOCYTES NFR BLD: 42 % (ref 12–49)
MCH RBC QN AUTO: 30.1 PG (ref 26–34)
MCHC RBC AUTO-ENTMCNC: 31.8 G/DL (ref 30–36.5)
MCV RBC AUTO: 94.7 FL (ref 80–99)
MONOCYTES # BLD: 0.5 K/UL (ref 0–1)
MONOCYTES NFR BLD: 9 % (ref 5–13)
NEUTS SEG # BLD: 2.4 K/UL (ref 1.8–8)
NEUTS SEG NFR BLD: 48 % (ref 32–75)
NRBC # BLD: 0 K/UL (ref 0–0.01)
NRBC BLD-RTO: 0 PER 100 WBC
PLATELET # BLD AUTO: 357 K/UL (ref 150–400)
PMV BLD AUTO: 9.7 FL (ref 8.9–12.9)
POTASSIUM SERPL-SCNC: 4 MMOL/L (ref 3.5–5.1)
PROT SERPL-MCNC: 7.7 G/DL (ref 6.4–8.2)
RBC # BLD AUTO: 4.18 M/UL (ref 3.8–5.2)
SODIUM SERPL-SCNC: 138 MMOL/L (ref 136–145)
WBC # BLD AUTO: 5.2 K/UL (ref 3.6–11)

## 2020-10-06 PROCEDURE — 83001 ASSAY OF GONADOTROPIN (FSH): CPT

## 2020-10-06 PROCEDURE — 85025 COMPLETE CBC W/AUTO DIFF WBC: CPT

## 2020-10-06 PROCEDURE — 80053 COMPREHEN METABOLIC PANEL: CPT

## 2020-10-06 PROCEDURE — 84146 ASSAY OF PROLACTIN: CPT

## 2020-10-07 LAB — PROLACTIN SERPL-MCNC: 5.9 NG/ML

## 2022-12-20 ENCOUNTER — HOSPITAL ENCOUNTER (EMERGENCY)
Age: 42
Discharge: HOME | End: 2022-12-20
Attending: STUDENT IN AN ORGANIZED HEALTH CARE EDUCATION/TRAINING PROGRAM

## 2022-12-20 VITALS
OXYGEN SATURATION: 98 % | HEIGHT: 59 IN | HEART RATE: 71 BPM | SYSTOLIC BLOOD PRESSURE: 116 MMHG | BODY MASS INDEX: 25 KG/M2 | RESPIRATION RATE: 16 BRPM | DIASTOLIC BLOOD PRESSURE: 64 MMHG | WEIGHT: 124 LBS | TEMPERATURE: 98.6 F
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
## 2022-12-20 DIAGNOSIS — M54.2 NECK PAIN ON RIGHT SIDE: ICD-10-CM

## 2022-12-20 DIAGNOSIS — R51.9 ACUTE NONINTRACTABLE HEADACHE, UNSPECIFIED HEADACHE TYPE: ICD-10-CM

## 2022-12-20 PROCEDURE — 74011250637 HC RX REV CODE- 250/637: Performed by: STUDENT IN AN ORGANIZED HEALTH CARE EDUCATION/TRAINING PROGRAM

## 2022-12-20 PROCEDURE — 99283 EMERGENCY DEPT VISIT LOW MDM: CPT

## 2022-12-20 RX ORDER — NAPROXEN 500 MG/1
500 TABLET ORAL
Qty: 20 TABLET | Refills: 0 | Status: SHIPPED | OUTPATIENT
Start: 2022-12-20

## 2022-12-20 RX ORDER — FLUOXETINE HYDROCHLORIDE 20 MG/1
20 CAPSULE ORAL DAILY
COMMUNITY

## 2022-12-20 RX ORDER — CYCLOBENZAPRINE HCL 10 MG
10 TABLET ORAL
Qty: 20 TABLET | Refills: 0 | Status: SHIPPED | OUTPATIENT
Start: 2022-12-20

## 2022-12-20 RX ORDER — NAPROXEN 250 MG/1
500 TABLET ORAL
Status: COMPLETED | OUTPATIENT
Start: 2022-12-20 | End: 2022-12-20

## 2022-12-20 RX ADMIN — NAPROXEN 500 MG: 250 TABLET ORAL at 20:39
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
## 2023-04-28 ENCOUNTER — HOSPITAL ENCOUNTER (OUTPATIENT)
Facility: HOSPITAL | Age: 43
Setting detail: SPECIMEN
Discharge: HOME | End: 2023-05-01

## 2023-04-28 PROCEDURE — 87624 HPV HI-RISK TYP POOLED RSLT: CPT

## 2023-04-28 PROCEDURE — 88175 CYTOPATH C/V AUTO FLUID REDO: CPT

## 2024-11-26 ENCOUNTER — HOSPITAL ENCOUNTER (OUTPATIENT)
Facility: HOSPITAL | Age: 44
Setting detail: SPECIMEN
Discharge: HOME | End: 2024-11-29

## 2024-11-26 PROCEDURE — 87661 TRICHOMONAS VAGINALIS AMPLIF: CPT

## 2024-11-26 PROCEDURE — 87591 N.GONORRHOEAE DNA AMP PROB: CPT

## 2024-11-26 PROCEDURE — 87491 CHLMYD TRACH DNA AMP PROBE: CPT

## 2024-11-26 PROCEDURE — 87801 DETECT AGNT MULT DNA AMPLI: CPT

## 2024-11-26 PROCEDURE — 87798 DETECT AGENT NOS DNA AMP: CPT

## 2024-12-01 LAB
A VAGINAE DNA VAG QL NAA+PROBE: ABNORMAL SCORE
BVAB2 DNA VAG QL NAA+PROBE: ABNORMAL SCORE
C ALBICANS DNA VAG QL NAA+PROBE: NEGATIVE
C GLABRATA DNA VAG QL NAA+PROBE: NEGATIVE
C TRACH RRNA SPEC QL NAA+PROBE: NEGATIVE
CANDIDA KRUSEI: NEGATIVE
CANDIDA LUSITANIAE, NAA: NEGATIVE
CANDIDA PARAPSILOSIS/TROPICALIS: NEGATIVE
MEGA1 DNA VAG QL NAA+PROBE: ABNORMAL SCORE
N GONORRHOEA RRNA SPEC QL NAA+PROBE: NEGATIVE
T VAGINALIS RRNA SPEC QL NAA+PROBE: NEGATIVE